# Patient Record
Sex: MALE | Race: ASIAN | Employment: FULL TIME | URBAN - METROPOLITAN AREA
[De-identification: names, ages, dates, MRNs, and addresses within clinical notes are randomized per-mention and may not be internally consistent; named-entity substitution may affect disease eponyms.]

---

## 2022-12-14 ENCOUNTER — OFFICE VISIT (OUTPATIENT)
Dept: FAMILY MEDICINE CLINIC | Facility: CLINIC | Age: 37
End: 2022-12-14
Payer: COMMERCIAL

## 2022-12-14 VITALS
RESPIRATION RATE: 16 BRPM | HEART RATE: 62 BPM | DIASTOLIC BLOOD PRESSURE: 82 MMHG | SYSTOLIC BLOOD PRESSURE: 112 MMHG | OXYGEN SATURATION: 98 % | WEIGHT: 178 LBS | HEIGHT: 67 IN | BODY MASS INDEX: 27.94 KG/M2

## 2022-12-14 DIAGNOSIS — L50.9 HIVES: ICD-10-CM

## 2022-12-14 DIAGNOSIS — Z13.220 LIPID SCREENING: ICD-10-CM

## 2022-12-14 DIAGNOSIS — Z13.0 SCREENING FOR DEFICIENCY ANEMIA: Primary | ICD-10-CM

## 2022-12-14 DIAGNOSIS — L29.9 ITCHING: ICD-10-CM

## 2022-12-14 PROCEDURE — 3008F BODY MASS INDEX DOCD: CPT | Performed by: FAMILY MEDICINE

## 2022-12-14 PROCEDURE — 3074F SYST BP LT 130 MM HG: CPT | Performed by: FAMILY MEDICINE

## 2022-12-14 PROCEDURE — 99213 OFFICE O/P EST LOW 20 MIN: CPT | Performed by: FAMILY MEDICINE

## 2022-12-14 PROCEDURE — 3079F DIAST BP 80-89 MM HG: CPT | Performed by: FAMILY MEDICINE

## 2022-12-14 RX ORDER — PREDNISONE 10 MG/1
TABLET ORAL
Qty: 30 TABLET | Refills: 0 | Status: SHIPPED | OUTPATIENT
Start: 2022-12-14 | End: 2022-12-26

## 2022-12-18 LAB
ABSOLUTE BASOPHILS: 39 CELLS/UL (ref 0–200)
ABSOLUTE EOSINOPHILS: 78 CELLS/UL (ref 15–500)
ABSOLUTE LYMPHOCYTES: 2900 CELLS/UL (ref 850–3900)
ABSOLUTE MONOCYTES: 737 CELLS/UL (ref 200–950)
ABSOLUTE NEUTROPHILS: 5946 CELLS/UL (ref 1500–7800)
ALBUMIN/GLOBULIN RATIO: 1.9 (CALC) (ref 1–2.5)
ALBUMIN: 4.6 G/DL (ref 3.6–5.1)
ALKALINE PHOSPHATASE: 56 U/L (ref 36–130)
ALT: 45 U/L (ref 9–46)
AST: 26 U/L (ref 10–40)
BASOPHILS: 0.4 %
BILIRUBIN, TOTAL: 0.9 MG/DL (ref 0.2–1.2)
BUN: 16 MG/DL (ref 7–25)
CALCIUM: 9.7 MG/DL (ref 8.6–10.3)
CARBON DIOXIDE: 27 MMOL/L (ref 20–32)
CHLORIDE: 104 MMOL/L (ref 98–110)
CHOL/HDLC RATIO: 4.1 (CALC)
CHOLESTEROL, TOTAL: 203 MG/DL
CREATININE: 0.9 MG/DL (ref 0.6–1.26)
EGFR: 113 ML/MIN/1.73M2
EOSINOPHILS: 0.8 %
GLOBULIN: 2.4 G/DL (CALC) (ref 1.9–3.7)
GLUCOSE: 83 MG/DL (ref 65–99)
HDL CHOLESTEROL: 49 MG/DL
HEMATOCRIT: 51.3 % (ref 38.5–50)
HEMOGLOBIN: 17 G/DL (ref 13.2–17.1)
LDL-CHOLESTEROL: 128 MG/DL (CALC)
LYMPHOCYTES: 29.9 %
MCH: 28.9 PG (ref 27–33)
MCHC: 33.1 G/DL (ref 32–36)
MCV: 87.1 FL (ref 80–100)
MONOCYTES: 7.6 %
MPV: 11.4 FL (ref 7.5–12.5)
NEUTROPHILS: 61.3 %
NON-HDL CHOLESTEROL: 154 MG/DL (CALC)
PLATELET COUNT: 257 THOUSAND/UL (ref 140–400)
POTASSIUM: 4.1 MMOL/L (ref 3.5–5.3)
PROTEIN, TOTAL: 7 G/DL (ref 6.1–8.1)
RDW: 12.7 % (ref 11–15)
RED BLOOD CELL COUNT: 5.89 MILLION/UL (ref 4.2–5.8)
SODIUM: 141 MMOL/L (ref 135–146)
TRIGLYCERIDES: 147 MG/DL
WHITE BLOOD CELL COUNT: 9.7 THOUSAND/UL (ref 3.8–10.8)

## 2022-12-28 ENCOUNTER — TELEMEDICINE (OUTPATIENT)
Dept: FAMILY MEDICINE CLINIC | Facility: CLINIC | Age: 37
End: 2022-12-28
Payer: COMMERCIAL

## 2022-12-28 DIAGNOSIS — L50.9 HIVES: Primary | ICD-10-CM

## 2022-12-28 DIAGNOSIS — L29.9 ITCHING: ICD-10-CM

## 2022-12-28 PROCEDURE — 99213 OFFICE O/P EST LOW 20 MIN: CPT | Performed by: FAMILY MEDICINE

## 2022-12-28 RX ORDER — ALBUTEROL SULFATE 90 UG/1
2 AEROSOL, METERED RESPIRATORY (INHALATION)
Qty: 1 EACH | Refills: 0 | Status: SHIPPED | OUTPATIENT
Start: 2022-12-28 | End: 2023-02-26

## 2022-12-28 RX ORDER — HYDROXYZINE HYDROCHLORIDE 25 MG/1
25 TABLET, FILM COATED ORAL 3 TIMES DAILY PRN
Qty: 30 TABLET | Refills: 0 | Status: SHIPPED | OUTPATIENT
Start: 2022-12-28

## 2022-12-28 RX ORDER — TRIAMCINOLONE ACETONIDE 1 MG/G
CREAM TOPICAL 2 TIMES DAILY PRN
Qty: 60 G | Refills: 3 | Status: SHIPPED | OUTPATIENT
Start: 2022-12-28

## 2024-05-08 ENCOUNTER — TELEPHONE (OUTPATIENT)
Dept: ORTHOPEDICS CLINIC | Facility: CLINIC | Age: 39
End: 2024-05-08

## 2024-05-08 DIAGNOSIS — G89.29 CHRONIC PAIN OF RIGHT ANKLE: Primary | ICD-10-CM

## 2024-05-08 DIAGNOSIS — M25.571 RIGHT ANKLE PAIN, UNSPECIFIED CHRONICITY: ICD-10-CM

## 2024-05-08 DIAGNOSIS — M25.571 CHRONIC PAIN OF RIGHT ANKLE: Primary | ICD-10-CM

## 2024-05-08 NOTE — TELEPHONE ENCOUNTER
Xray ordered per Ortho protocol  Mychart message sent to patient to arrive 15 - 20 min early to complete imaging.

## 2024-05-23 ENCOUNTER — HOSPITAL ENCOUNTER (OUTPATIENT)
Dept: GENERAL RADIOLOGY | Age: 39
Discharge: HOME | End: 2024-05-23
Attending: FAMILY MEDICINE

## 2024-05-23 ENCOUNTER — OFFICE VISIT (OUTPATIENT)
Dept: ORTHOPEDICS CLINIC | Facility: CLINIC | Age: 39
End: 2024-05-23

## 2024-05-23 VITALS — BODY MASS INDEX: 28.79 KG/M2 | HEIGHT: 68 IN | WEIGHT: 190 LBS

## 2024-05-23 DIAGNOSIS — M25.571 RIGHT ANKLE PAIN, UNSPECIFIED CHRONICITY: ICD-10-CM

## 2024-05-23 DIAGNOSIS — M19.071 ARTHRITIS OF RIGHT ANKLE: Primary | ICD-10-CM

## 2024-05-23 DIAGNOSIS — Z98.890 HISTORY OF ANKLE SURGERY: ICD-10-CM

## 2024-05-23 PROCEDURE — 99204 OFFICE O/P NEW MOD 45 MIN: CPT | Performed by: FAMILY MEDICINE

## 2024-05-23 PROCEDURE — 73610 X-RAY EXAM OF ANKLE: CPT | Performed by: FAMILY MEDICINE

## 2024-05-23 RX ORDER — MONTELUKAST SODIUM 10 MG/1
10 TABLET ORAL DAILY
COMMUNITY
Start: 2023-02-17

## 2024-05-23 RX ORDER — TRIAMCINOLONE ACETONIDE 40 MG/ML
40 INJECTION, SUSPENSION INTRA-ARTICULAR; INTRAMUSCULAR ONCE
Status: DISCONTINUED | OUTPATIENT
Start: 2024-05-23 | End: 2024-05-23

## 2024-05-23 RX ORDER — KETOROLAC TROMETHAMINE 30 MG/ML
30 INJECTION, SOLUTION INTRAMUSCULAR; INTRAVENOUS ONCE
Status: DISCONTINUED | OUTPATIENT
Start: 2024-05-23 | End: 2024-05-23

## 2024-05-23 NOTE — H&P
Sports Medicine Clinic Note     Subjective:    Chief Complaint: Chronic right ankle discomfort.    History of Present Illness: The patient is a pleasant 38-year-old male who presents with chronic right ankle pain. He reports intermittent pain, particularly over the dorsal tibio-talar region. The patient has a complicated orthopedic history with surgery on his right ankle and foot in 2019 for an \"alignment issue\". He is unable to recall the exact details of the surgery but from his description possibly related to a cavovarus foot deformity. There are no outside records available for review. The patient denies any recent trauma, significant swelling, numbness, or tingling. He reports the pain is exacerbated by physical activities and weight training, particularly heavy squatting and deadlifting which he does for exercise regularly. The primary concern for today’s visit is to ensure the integrity of the surgical hardware and address the chronic discomfort.    Objective:    Right Ankle Examination:    Inspection:  - No erythema, warmth, or swelling  - Presence of surgical scars over the lateral and medial aspects of the ankle, healed without signs of complication  - Mild varus alignment of the foot    Palpation:  - Tenderness over the dorsal tibiotalar joint  - No tenderness over the medial malleolus, lateral malleolus, or Achilles tendon    Range of Motion:  - Dorsiflexion: 0-10 degrees without discomfort  - Plantarflexion: 0-30 degrees without discomfort  - Inversion: 0-20 degrees without discomfort  - Eversion: 0-10 degrees without discomfort    Neurovascular:  - Sensation intact to light touch in the superficial peroneal, deep peroneal, tibial, and sural nerve distributions  - Motor function intact: able to fire peroneals, tibialis anterior, gastrocnemius-soleus complex, and extensor hallucis longus  - 2+ dorsalis pedis and posterior tibial pulses, brisk capillary refill    Special Tests:  - Negative anterior drawer  test  - Negative Talar tilt test  - Negative Squeeze test for syndesmosis injury  - Mild pain with single-leg hop test    Diagnostic Tests:    - X-ray (Three views) of the right ankle:    - No acute fracture or dislocation    - Ankle mortise is maintained    - Osteoarthritis with osteophyte formation at the tibiotalar joint    - Calcaneal surgical screws and a staple in the first metatarsal are intact    Assessment:    1. Chronic right ankle pain with early likely secondary osteoarthritis.  2. Status post right ankle surgery in 2019 with intact surgical hardware.  3. No evidence of acute osseous pathology.    Plan:    Additional Imaging/Workup:  - MRI of the right ankle may be considered if symptoms persist or worsen to evaluate soft tissue structures and further assess osteoarthritis.    Therapy:  - Recommend physical therapy focusing on ankle strengthening and mobility exercise, deferred for time being.  - Advise the patient to avoid high-impact activities and modify his weight training regimen to reduce stress on the ankle (e.g., limit to a 6-10 rep range and avoid excessive pressure on the foot and ankle complex).    Medications:  - Prescribe topical NSAIDs (e.g., Diclofenac gel) for pain management.    Bracing/Casting:  - Consider a custom orthotic or ankle brace for additional support during physical activities if symptoms persist.    Procedures:  - Discuss the possibility of corticosteroid injections if conservative measures fail to provide adequate relief.    Activity Recommendations:  - Encourage the patient to modify his powerlifting routine to avoid exacerbating the pain. Specifically, he should limit the weight and frequency of squats and deadlifts.    Follow-Up:  - As needed if symptoms worsen or new symptoms develop.        Jerson Robin DO, CAM   Primary Care Sports Medicine    Department of Orthopaedic Surgery  [address] [id number] [address] [facility]  [place] [address]    t: [phone]  f: [phone]      [email or web address]